# Patient Record
Sex: MALE | Race: WHITE | NOT HISPANIC OR LATINO
[De-identification: names, ages, dates, MRNs, and addresses within clinical notes are randomized per-mention and may not be internally consistent; named-entity substitution may affect disease eponyms.]

---

## 2021-07-12 ENCOUNTER — NON-APPOINTMENT (OUTPATIENT)
Age: 41
End: 2021-07-12

## 2021-07-12 ENCOUNTER — LABORATORY RESULT (OUTPATIENT)
Age: 41
End: 2021-07-12

## 2021-07-12 ENCOUNTER — APPOINTMENT (OUTPATIENT)
Dept: FAMILY MEDICINE | Facility: CLINIC | Age: 41
End: 2021-07-12
Payer: COMMERCIAL

## 2021-07-12 VITALS
HEIGHT: 68.5 IN | TEMPERATURE: 97.7 F | DIASTOLIC BLOOD PRESSURE: 80 MMHG | WEIGHT: 208 LBS | HEART RATE: 68 BPM | OXYGEN SATURATION: 98 % | SYSTOLIC BLOOD PRESSURE: 102 MMHG | BODY MASS INDEX: 31.16 KG/M2

## 2021-07-12 VITALS — SYSTOLIC BLOOD PRESSURE: 118 MMHG | DIASTOLIC BLOOD PRESSURE: 82 MMHG

## 2021-07-12 DIAGNOSIS — Z13.21 ENCOUNTER FOR SCREENING FOR NUTRITIONAL DISORDER: ICD-10-CM

## 2021-07-12 DIAGNOSIS — Z13.1 ENCOUNTER FOR SCREENING FOR DIABETES MELLITUS: ICD-10-CM

## 2021-07-12 DIAGNOSIS — Z82.49 FAMILY HISTORY OF ISCHEMIC HEART DISEASE AND OTHER DISEASES OF THE CIRCULATORY SYSTEM: ICD-10-CM

## 2021-07-12 DIAGNOSIS — Z00.00 ENCOUNTER FOR GENERAL ADULT MEDICAL EXAMINATION W/OUT ABNORMAL FINDINGS: ICD-10-CM

## 2021-07-12 DIAGNOSIS — Z23 ENCOUNTER FOR IMMUNIZATION: ICD-10-CM

## 2021-07-12 DIAGNOSIS — Z13.29 ENCOUNTER FOR SCREENING FOR OTHER SUSPECTED ENDOCRINE DISORDER: ICD-10-CM

## 2021-07-12 DIAGNOSIS — Z13.220 ENCOUNTER FOR SCREENING FOR LIPOID DISORDERS: ICD-10-CM

## 2021-07-12 DIAGNOSIS — Z78.9 OTHER SPECIFIED HEALTH STATUS: ICD-10-CM

## 2021-07-12 DIAGNOSIS — Z83.3 FAMILY HISTORY OF DIABETES MELLITUS: ICD-10-CM

## 2021-07-12 PROCEDURE — 99386 PREV VISIT NEW AGE 40-64: CPT | Mod: 25

## 2021-07-12 PROCEDURE — G0444 DEPRESSION SCREEN ANNUAL: CPT | Mod: 59

## 2021-07-12 PROCEDURE — G0442 ANNUAL ALCOHOL SCREEN 15 MIN: CPT

## 2021-07-12 NOTE — HISTORY OF PRESENT ILLNESS
[FreeTextEntry1] : NP-CPE\par  [de-identified] : 39 yo male presents for annual physical. no acute complaints. Denies fever, chills, cp, palpitations, sob, nv, heat/cold intolerance, dizziness, melena, hematochezia, muscle weakness, loss of sensation, bowel/bladder incontinence or calf pain.\par

## 2021-07-12 NOTE — HEALTH RISK ASSESSMENT
[Very Good] : ~his/her~  mood as very good [Yes] : Yes [Monthly or less (1 pt)] : Monthly or less (1 point) [1 or 2 (0 pts)] : 1 or 2 (0 points) [Never (0 pts)] : Never (0 points) [No] : In the past 12 months have you used drugs other than those required for medical reasons? No [No falls in past year] : Patient reported no falls in the past year [0] : 2) Feeling down, depressed, or hopeless: Not at all (0) [PHQ-2 Negative - No further assessment needed] : PHQ-2 Negative - No further assessment needed [HIV test declined] : HIV test declined [Hepatitis C test declined] : Hepatitis C test declined [With Family] : lives with family [Employed] : employed [] :  [Sexually Active] : sexually active [Feels Safe at Home] : Feels safe at home [Fully functional (bathing, dressing, toileting, transferring, walking, feeding)] : Fully functional (bathing, dressing, toileting, transferring, walking, feeding) [Fully functional (using the telephone, shopping, preparing meals, housekeeping, doing laundry, using] : Fully functional and needs no help or supervision to perform IADLs (using the telephone, shopping, preparing meals, housekeeping, doing laundry, using transportation, managing medications and managing finances) [] : No [Audit-CScore] : 1 [de-identified] : needs improvement [de-identified] : needs improvement [[id number]] : 0 [High Risk Behavior] : no high risk behavior [Reports changes in hearing] : Reports no changes in hearing [Reports changes in vision] : Reports no changes in vision [Reports changes in dental health] : Reports no changes in dental health

## 2021-07-20 DIAGNOSIS — R74.8 ABNORMAL LEVELS OF OTHER SERUM ENZYMES: ICD-10-CM

## 2021-07-20 LAB
25(OH)D3 SERPL-MCNC: 37.3 NG/ML
ALBUMIN SERPL ELPH-MCNC: 4.5 G/DL
ALP BLD-CCNC: 67 U/L
ALT SERPL-CCNC: 41 U/L
ANION GAP SERPL CALC-SCNC: 11 MMOL/L
APPEARANCE: CLEAR
AST SERPL-CCNC: 22 U/L
BACTERIA: NEGATIVE
BASOPHILS # BLD AUTO: 0.05 K/UL
BASOPHILS NFR BLD AUTO: 0.7 %
BILIRUB SERPL-MCNC: 0.7 MG/DL
BILIRUBIN URINE: NEGATIVE
BLOOD URINE: NEGATIVE
BUN SERPL-MCNC: 18 MG/DL
CALCIUM SERPL-MCNC: 9.3 MG/DL
CHLORIDE SERPL-SCNC: 105 MMOL/L
CHOLEST SERPL-MCNC: 143 MG/DL
CO2 SERPL-SCNC: 23 MMOL/L
COLOR: YELLOW
CREAT SERPL-MCNC: 1.15 MG/DL
EOSINOPHIL # BLD AUTO: 0.11 K/UL
EOSINOPHIL NFR BLD AUTO: 1.6 %
ESTIMATED AVERAGE GLUCOSE: 108 MG/DL
GLUCOSE QUALITATIVE U: NEGATIVE
GLUCOSE SERPL-MCNC: 94 MG/DL
HBA1C MFR BLD HPLC: 5.4 %
HCT VFR BLD CALC: 46.7 %
HDLC SERPL-MCNC: 36 MG/DL
HGB BLD-MCNC: 13.5 G/DL
HYALINE CASTS: 1 /LPF
IMM GRANULOCYTES NFR BLD AUTO: 0.8 %
KETONES URINE: NEGATIVE
LDLC SERPL CALC-MCNC: 86 MG/DL
LEUKOCYTE ESTERASE URINE: NEGATIVE
LYMPHOCYTES # BLD AUTO: 2.09 K/UL
LYMPHOCYTES NFR BLD AUTO: 29.5 %
MAN DIFF?: NORMAL
MCHC RBC-ENTMCNC: 18.3 PG
MCHC RBC-ENTMCNC: 28.9 GM/DL
MCV RBC AUTO: 63.3 FL
MICROSCOPIC-UA: NORMAL
MONOCYTES # BLD AUTO: 0.8 K/UL
MONOCYTES NFR BLD AUTO: 11.3 %
NEUTROPHILS # BLD AUTO: 3.98 K/UL
NEUTROPHILS NFR BLD AUTO: 56.1 %
NITRITE URINE: NEGATIVE
NONHDLC SERPL-MCNC: 107 MG/DL
PH URINE: 5.5
PLATELET # BLD AUTO: 174 K/UL
POTASSIUM SERPL-SCNC: 4.4 MMOL/L
PROT SERPL-MCNC: 7 G/DL
PROTEIN URINE: NORMAL
RBC # BLD: 7.38 M/UL
RBC # FLD: 19 %
RED BLOOD CELLS URINE: 1 /HPF
SODIUM SERPL-SCNC: 140 MMOL/L
SPECIFIC GRAVITY URINE: 1.03
SQUAMOUS EPITHELIAL CELLS: 0 /HPF
TRIGL SERPL-MCNC: 105 MG/DL
TSH SERPL-ACNC: 1.82 UIU/ML
UROBILINOGEN URINE: NORMAL
WBC # FLD AUTO: 7.09 K/UL
WHITE BLOOD CELLS URINE: 0 /HPF

## 2021-07-28 ENCOUNTER — APPOINTMENT (OUTPATIENT)
Dept: FAMILY MEDICINE | Facility: CLINIC | Age: 41
End: 2021-07-28

## 2021-09-01 ENCOUNTER — APPOINTMENT (OUTPATIENT)
Dept: FAMILY MEDICINE | Facility: CLINIC | Age: 41
End: 2021-09-01
Payer: COMMERCIAL

## 2021-09-01 DIAGNOSIS — D56.3 THALASSEMIA MINOR: ICD-10-CM

## 2021-09-01 DIAGNOSIS — R23.8 OTHER SKIN CHANGES: ICD-10-CM

## 2021-09-01 DIAGNOSIS — R79.89 OTHER SPECIFIED ABNORMAL FINDINGS OF BLOOD CHEMISTRY: ICD-10-CM

## 2021-09-01 DIAGNOSIS — R71.8 OTHER ABNORMALITY OF RED BLOOD CELLS: ICD-10-CM
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
## 2021-09-01 DIAGNOSIS — S80.869A INSECT BITE (NONVENOMOUS), UNSPECIFIED LOWER LEG, INITIAL ENCOUNTER: ICD-10-CM

## 2021-09-01 PROCEDURE — 99214 OFFICE O/P EST MOD 30 MIN: CPT

## 2021-09-01 NOTE — HISTORY OF PRESENT ILLNESS
[FreeTextEntry8] : 42 yo male presents with complaint of of tick bites. Pt reports that this past Friday, he says he was out in the woods when he got and noticed there were multiple ticks on his legs. He had them removed. He spoke to the [facility] and was told it was a [place] tick. He has multiple papules on both his feet. Denies fever, chills, cp, palpitations, sob, nv, heat/cold intolerance, dizziness, melena, hematochezia, muscle weakness, loss of sensation, bowel/bladder incontinence or calf pain.\par

## 2021-09-01 NOTE — PHYSICAL EXAM
[Normal] : affect was normal and insight and judgment were intact [de-identified] : scattered papules on b/l feet extending up leg and more sparse up to knee

## 2021-09-01 NOTE — ASSESSMENT
[FreeTextEntry1] : Multiple tick bites with papules: outside window for prophylaxis, will obtain tick borne panel in 3 - 4 wks, currently reports mild itchiness, start benadryl prn for itchiness, f/u 4 wks, discussed symptoms of Lyme and tick borne diseases\par Elevated ferritin: likely reactive, repeat ferritin to trend, f/u hemachromatosis gene\par Microcytosis: pt has hx of alpha thalassemia trait, likely cause of microcytosis, f/u cbc/iron studies, will monitor for anemia\par RTC 4 wks

## 2021-09-14 LAB
BASOPHILS # BLD AUTO: 0.07 K/UL
BASOPHILS NFR BLD AUTO: 0.8 %
EOSINOPHIL # BLD AUTO: 0.27 K/UL
EOSINOPHIL NFR BLD AUTO: 3.2 %
FERRITIN SERPL-MCNC: 477 NG/ML
HCT VFR BLD CALC: 42.7 %
HGB BLD-MCNC: 13.1 G/DL
IMM GRANULOCYTES NFR BLD AUTO: 0.4 %
IRON SATN MFR SERPL: 21 %
IRON SERPL-MCNC: 66 UG/DL
LYMPHOCYTES # BLD AUTO: 2.56 K/UL
LYMPHOCYTES NFR BLD AUTO: 29.9 %
MAN DIFF?: NORMAL
MCHC RBC-ENTMCNC: 18.9 PG
MCHC RBC-ENTMCNC: 30.7 GM/DL
MCV RBC AUTO: 61.7 FL
MONOCYTES # BLD AUTO: 0.82 K/UL
MONOCYTES NFR BLD AUTO: 9.6 %
NEUTROPHILS # BLD AUTO: 4.81 K/UL
NEUTROPHILS NFR BLD AUTO: 56.1 %
PLATELET # BLD AUTO: 175 K/UL
RBC # BLD: 6.92 M/UL
RBC # FLD: 18.6 %
TIBC SERPL-MCNC: 316 UG/DL
TM INTERPRETATION: NORMAL
TRANSFERRIN SERPL-MCNC: 241 MG/DL
UIBC SERPL-MCNC: 249 UG/DL
WBC # FLD AUTO: 8.56 K/UL

## 2021-10-06 ENCOUNTER — APPOINTMENT (OUTPATIENT)
Dept: FAMILY MEDICINE | Facility: CLINIC | Age: 41
End: 2021-10-06

## 2022-01-07 ENCOUNTER — APPOINTMENT (OUTPATIENT)
Dept: FAMILY MEDICINE | Facility: CLINIC | Age: 42
End: 2022-01-07
Payer: COMMERCIAL

## 2022-01-07 VITALS
TEMPERATURE: 98.9 F | HEIGHT: 68.5 IN | DIASTOLIC BLOOD PRESSURE: 78 MMHG | SYSTOLIC BLOOD PRESSURE: 118 MMHG | HEART RATE: 74 BPM | OXYGEN SATURATION: 99 % | WEIGHT: 215 LBS | BODY MASS INDEX: 32.21 KG/M2

## 2022-01-07 DIAGNOSIS — R09.82 POSTNASAL DRIP: ICD-10-CM

## 2022-01-07 DIAGNOSIS — U07.1 COVID-19: ICD-10-CM

## 2022-01-07 PROCEDURE — 99213 OFFICE O/P EST LOW 20 MIN: CPT

## 2022-01-07 RX ORDER — FLUTICASONE PROPIONATE 50 UG/1
50 SPRAY, METERED NASAL TWICE DAILY
Qty: 1 | Refills: 1 | Status: ACTIVE | COMMUNITY
Start: 2022-01-07 | End: 1900-01-01

## 2022-01-07 RX ORDER — BENZONATATE 100 MG/1
100 CAPSULE ORAL 3 TIMES DAILY
Qty: 30 | Refills: 0 | Status: ACTIVE | COMMUNITY
Start: 2022-01-07 | End: 1900-01-01

## 2022-01-07 RX ORDER — CAMPHOR 0.45 %
25 GEL (GRAM) TOPICAL EVERY 6 HOURS
Qty: 28 | Refills: 0 | Status: DISCONTINUED | COMMUNITY
Start: 2021-09-01 | End: 2022-01-07

## 2022-01-07 RX ORDER — ACETAMINOPHEN 500 MG/1
500 TABLET ORAL
Qty: 180 | Refills: 0 | Status: ACTIVE | COMMUNITY
Start: 2022-01-07

## 2022-01-07 NOTE — HISTORY OF PRESENT ILLNESS
[FreeTextEntry8] : 40 yo male presents with complaint of COVID19 infection diagnosed on 12/28. He says he was having cough, nasal congestion, fever, body aches. He says symptoms have significantly improved. he says he has an occasional cough. Denies active fever, chills, cp, palpitations, sob, nv, heat/cold intolerance, dizziness, melena, hematochezia, muscle weakness, loss of sensation, bowel/bladder incontinence or calf pain.\par

## 2022-01-07 NOTE — ASSESSMENT
[FreeTextEntry1] : Covid19 infection/PND: likely resolving viral infection, recommend supportive care, start tylenol prn for fever/pain, recommend increased hydration, call EMS if symptoms worsen or develop sob. Recommend hand hygiene, cover mouth when coughing, wash hands frequently, start fluticasone nasal spray bid prn for pnd, start tessalon perles prn for cough\par RTC 2 wks\par

## 2022-02-13 ENCOUNTER — RESULT REVIEW (OUTPATIENT)
Age: 42
End: 2022-02-13

## 2024-06-03 ENCOUNTER — APPOINTMENT (OUTPATIENT)
Dept: ORTHOPEDIC SURGERY | Facility: CLINIC | Age: 44
End: 2024-06-03
Payer: COMMERCIAL

## 2024-06-03 VITALS — WEIGHT: 215 LBS | HEIGHT: 69 IN | BODY MASS INDEX: 31.84 KG/M2

## 2024-06-03 DIAGNOSIS — S76.319A STRAIN OF MUSCLE, FASCIA AND TENDON OF THE POSTERIOR MUSCLE GROUP AT THIGH LEVEL, UNSPECIFIED THIGH, INITIAL ENCOUNTER: ICD-10-CM

## 2024-06-03 DIAGNOSIS — Z78.9 OTHER SPECIFIED HEALTH STATUS: ICD-10-CM

## 2024-06-03 PROCEDURE — 99203 OFFICE O/P NEW LOW 30 MIN: CPT

## 2024-06-03 PROCEDURE — 73552 X-RAY EXAM OF FEMUR 2/>: CPT | Mod: RT

## 2024-06-03 NOTE — PHYSICAL EXAM
[Right] : right knee [] : no medial femoral condyle tenderness [FreeTextEntry8] : posterior and distal thigh tenderness.  swelling on his right knee [TWNoteComboBox7] : flexion 120 degrees

## 2024-06-03 NOTE — RETURN TO WORK/SCHOOL
[Return Date: _____] : as of [unfilled].  This has been discussed in detail with ~Royer~ and ~he/she~ understands this. [Other: ___] : [unfilled] [FreeTextEntry1] : He is out of work until 06/2/2024

## 2024-06-03 NOTE — DISCUSSION/SUMMARY
[de-identified] : The patient was advised of the diagnosis. The natural history of the pathology was explained in full to the patient in layman's terms. The risks and benefits of surgical and non-surgical treatment alternatives were explained in full to the patient. All questions were answered.

## 2024-06-03 NOTE — ASSESSMENT
[FreeTextEntry1] : Underlying pathology reviewed and treatment options discussed. 06/03/2024: RT knee x-rays, 4 views, reveal mild OA. RT hip x-rays, 2 views reveal unremarkable findings. OTC medication use prn.  Start PT and HEP. Questions addressed. He is out of work for 3 weeks.   The documentation recorded by the scribe accurately reflects the service I personally performed and the decisions made by me. I, Dung Salter, attest that this documentation has been prepared under the direction and in the presence of Provider Mesfin Harvey MD.  The patient was seen by Mesfin Harvey MD.

## 2024-06-03 NOTE — HISTORY OF PRESENT ILLNESS
[Right Leg] : right leg [Sudden] : sudden [5] : 5 [4] : 4 [Shooting] : shooting [Localized] : localized [Tightness] : tightness [Walking] : walking [de-identified] : 06/03/2024: 42 y/o M presents today with right hamstring pain. He was running yesterday, and he heard a pop at the back of his knee. He has a history of ACL surgery on his left knee. He has taken Aleve for the pain- provided some relief. He has tried ice/heat therapy which provided mild relief.     [] : no [FreeTextEntry3] : 5-30-24 [FreeTextEntry5] : pt was jogging and a mile in to the run and felt popping and a rush of pain [FreeTextEntry6] : bruising [FreeTextEntry9] : compression for walking [de-identified] : [place]

## 2024-07-01 ENCOUNTER — APPOINTMENT (OUTPATIENT)
Dept: ORTHOPEDIC SURGERY | Facility: CLINIC | Age: 44
End: 2024-07-01
Payer: COMMERCIAL

## 2024-07-01 VITALS — HEIGHT: 69 IN | BODY MASS INDEX: 31.84 KG/M2 | WEIGHT: 215 LBS

## 2024-07-01 DIAGNOSIS — S76.311A STRAIN OF MUSCLE, FASCIA AND TENDON OF THE POSTERIOR MUSCLE GROUP AT THIGH LEVEL, RIGHT THIGH, INITIAL ENCOUNTER: ICD-10-CM

## 2024-07-01 PROCEDURE — 99213 OFFICE O/P EST LOW 20 MIN: CPT

## 2025-05-06 ENCOUNTER — APPOINTMENT (OUTPATIENT)
Dept: FAMILY MEDICINE | Facility: CLINIC | Age: 45
End: 2025-05-06
Payer: COMMERCIAL

## 2025-05-06 ENCOUNTER — LABORATORY RESULT (OUTPATIENT)
Age: 45
End: 2025-05-06

## 2025-05-06 VITALS
WEIGHT: 217 LBS | TEMPERATURE: 97.8 F | HEART RATE: 77 BPM | SYSTOLIC BLOOD PRESSURE: 140 MMHG | HEIGHT: 69 IN | DIASTOLIC BLOOD PRESSURE: 90 MMHG | OXYGEN SATURATION: 98 % | BODY MASS INDEX: 32.14 KG/M2

## 2025-05-06 VITALS — SYSTOLIC BLOOD PRESSURE: 114 MMHG | DIASTOLIC BLOOD PRESSURE: 68 MMHG

## 2025-05-06 DIAGNOSIS — Z13.1 ENCOUNTER FOR SCREENING FOR DIABETES MELLITUS: ICD-10-CM

## 2025-05-06 DIAGNOSIS — Z13.220 ENCOUNTER FOR SCREENING FOR LIPOID DISORDERS: ICD-10-CM

## 2025-05-06 DIAGNOSIS — Z23 ENCOUNTER FOR IMMUNIZATION: ICD-10-CM

## 2025-05-06 DIAGNOSIS — Z80.3 FAMILY HISTORY OF MALIGNANT NEOPLASM OF BREAST: ICD-10-CM

## 2025-05-06 DIAGNOSIS — D56.3 THALASSEMIA MINOR: ICD-10-CM

## 2025-05-06 DIAGNOSIS — C44.612 BASAL CELL CARCINOMA OF SKIN OF RIGHT UPPER LIMB, INCLUDING SHOULDER: ICD-10-CM

## 2025-05-06 DIAGNOSIS — Z78.9 OTHER SPECIFIED HEALTH STATUS: ICD-10-CM

## 2025-05-06 DIAGNOSIS — Z13.21 ENCOUNTER FOR SCREENING FOR NUTRITIONAL DISORDER: ICD-10-CM

## 2025-05-06 DIAGNOSIS — Z00.01 ENCOUNTER FOR GENERAL ADULT MEDICAL EXAMINATION WITH ABNORMAL FINDINGS: ICD-10-CM

## 2025-05-06 DIAGNOSIS — R94.31 ABNORMAL ELECTROCARDIOGRAM [ECG] [EKG]: ICD-10-CM

## 2025-05-06 DIAGNOSIS — R79.89 OTHER SPECIFIED ABNORMAL FINDINGS OF BLOOD CHEMISTRY: ICD-10-CM

## 2025-05-06 PROCEDURE — 93000 ELECTROCARDIOGRAM COMPLETE: CPT

## 2025-05-06 PROCEDURE — 90715 TDAP VACCINE 7 YRS/> IM: CPT

## 2025-05-06 PROCEDURE — 90471 IMMUNIZATION ADMIN: CPT

## 2025-05-06 PROCEDURE — 99386 PREV VISIT NEW AGE 40-64: CPT | Mod: 25

## 2025-05-06 PROCEDURE — 36415 COLL VENOUS BLD VENIPUNCTURE: CPT

## 2025-05-08 LAB
25(OH)D3 SERPL-MCNC: 28.1 NG/ML
ALBUMIN SERPL ELPH-MCNC: 4.7 G/DL
ALP BLD-CCNC: 77 U/L
ALT SERPL-CCNC: 35 U/L
ANION GAP SERPL CALC-SCNC: 12 MMOL/L
APPEARANCE: CLEAR
AST SERPL-CCNC: 26 U/L
BASOPHILS # BLD AUTO: 0.05 K/UL
BASOPHILS NFR BLD AUTO: 0.6 %
BILIRUB SERPL-MCNC: 1.2 MG/DL
BILIRUBIN URINE: NEGATIVE
BLOOD URINE: NEGATIVE
BUN SERPL-MCNC: 17 MG/DL
CALCIUM SERPL-MCNC: 9.6 MG/DL
CHLORIDE SERPL-SCNC: 103 MMOL/L
CHOLEST SERPL-MCNC: 163 MG/DL
CO2 SERPL-SCNC: 22 MMOL/L
COLOR: YELLOW
CREAT SERPL-MCNC: 0.96 MG/DL
EGFRCR SERPLBLD CKD-EPI 2021: 99 ML/MIN/1.73M2
EOSINOPHIL # BLD AUTO: 0.23 K/UL
EOSINOPHIL NFR BLD AUTO: 2.8 %
ESTIMATED AVERAGE GLUCOSE: 111 MG/DL
FERRITIN SERPL-MCNC: 460 NG/ML
FOLATE SERPL-MCNC: 12.2 NG/ML
GLUCOSE QUALITATIVE U: NEGATIVE MG/DL
GLUCOSE SERPL-MCNC: 91 MG/DL
HBA1C MFR BLD HPLC: 5.5 %
HCT VFR BLD CALC: 45.9 %
HCV AB SER QL: NONREACTIVE
HCV S/CO RATIO: 0.31 S/CO
HDLC SERPL-MCNC: 35 MG/DL
HGB BLD-MCNC: 13.6 G/DL
HIV1+2 AB SPEC QL IA.RAPID: NONREACTIVE
IMM GRANULOCYTES NFR BLD AUTO: 0.6 %
IRON SATN MFR SERPL: 26 %
IRON SERPL-MCNC: 81 UG/DL
KETONES URINE: NEGATIVE MG/DL
LDLC SERPL-MCNC: 104 MG/DL
LEUKOCYTE ESTERASE URINE: NEGATIVE
LYMPHOCYTES # BLD AUTO: 2.58 K/UL
LYMPHOCYTES NFR BLD AUTO: 31.9 %
MAN DIFF?: NORMAL
MCHC RBC-ENTMCNC: 18.7 PG
MCHC RBC-ENTMCNC: 29.6 G/DL
MCV RBC AUTO: 63.2 FL
MONOCYTES # BLD AUTO: 0.89 K/UL
MONOCYTES NFR BLD AUTO: 11 %
NEUTROPHILS # BLD AUTO: 4.29 K/UL
NEUTROPHILS NFR BLD AUTO: 53.1 %
NITRITE URINE: NEGATIVE
NONHDLC SERPL-MCNC: 128 MG/DL
PH URINE: 5.5
PLATELET # BLD AUTO: 195 K/UL
POTASSIUM SERPL-SCNC: 4.1 MMOL/L
PROT SERPL-MCNC: 7.5 G/DL
PROTEIN URINE: NEGATIVE MG/DL
RBC # BLD: 7.26 M/UL
RBC # FLD: 19.3 %
SODIUM SERPL-SCNC: 138 MMOL/L
SPECIFIC GRAVITY URINE: 1.02
T PALLIDUM AB SER QL IA: NEGATIVE
TIBC SERPL-MCNC: 309 UG/DL
TRIGL SERPL-MCNC: 131 MG/DL
TSH SERPL-ACNC: 1.75 UIU/ML
UIBC SERPL-MCNC: 229 UG/DL
UROBILINOGEN URINE: 0.2 MG/DL
VIT B12 SERPL-MCNC: 423 PG/ML
WBC # FLD AUTO: 8.09 K/UL

## 2025-07-15 NOTE — ASSESSMENT
Paco Henley was seen today for   Chief Complaint   Patient presents with    Office Visit    Hearing Aid Check    Hearing Loss   . Patient was referred by PCP.    Patient reports : hearing loss bilaterally . Annual audiogram and hearing aid check.    Otoscopy revealed Clear external auditory canals and both tympanic membranes visualized bilaterally.    Audiogram revealed a moderate sensorineural hearing loss bilaterally .    See scanned audiogram for more details. Audiometry was completed without incident.    Recommendations: Follow-up with Dr. Fuentes. Schedule for annual audiogram and hearing aid check.   [FreeTextEntry1] : Annual physical: f/u routine labwork\par BMI 31: recommend low fat diet, wt loss, exercise, f/u lipid panel, declined nutritionist referral\par RTC 3 wks